# Patient Record
Sex: FEMALE | Race: ASIAN | NOT HISPANIC OR LATINO | Employment: STUDENT | URBAN - METROPOLITAN AREA
[De-identification: names, ages, dates, MRNs, and addresses within clinical notes are randomized per-mention and may not be internally consistent; named-entity substitution may affect disease eponyms.]

---
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
## 2023-03-13 ENCOUNTER — OFFICE VISIT (OUTPATIENT)
Dept: URGENT CARE | Facility: URGENT CARE | Age: 23
End: 2023-03-13
Payer: COMMERCIAL

## 2023-03-13 VITALS
OXYGEN SATURATION: 100 % | DIASTOLIC BLOOD PRESSURE: 73 MMHG | WEIGHT: 125 LBS | HEART RATE: 64 BPM | BODY MASS INDEX: 22.15 KG/M2 | SYSTOLIC BLOOD PRESSURE: 110 MMHG | HEIGHT: 63 IN | TEMPERATURE: 98.8 F

## 2023-03-13 DIAGNOSIS — R30.0 DYSURIA: Primary | ICD-10-CM

## 2023-03-13 LAB
ALBUMIN UR-MCNC: NEGATIVE MG/DL
APPEARANCE UR: CLEAR
BILIRUB UR QL STRIP: NEGATIVE
COLOR UR AUTO: YELLOW
GLUCOSE UR STRIP-MCNC: NEGATIVE MG/DL
HGB UR QL STRIP: NEGATIVE
KETONES UR STRIP-MCNC: NEGATIVE MG/DL
LEUKOCYTE ESTERASE UR QL STRIP: NEGATIVE
NITRATE UR QL: NEGATIVE
PH UR STRIP: 7 [PH] (ref 5–7)
SP GR UR STRIP: 1.01 (ref 1–1.03)
UROBILINOGEN UR STRIP-ACNC: 0.2 E.U./DL

## 2023-03-13 PROCEDURE — 87491 CHLMYD TRACH DNA AMP PROBE: CPT | Performed by: FAMILY MEDICINE

## 2023-03-13 PROCEDURE — 81003 URINALYSIS AUTO W/O SCOPE: CPT

## 2023-03-13 PROCEDURE — 87591 N.GONORRHOEAE DNA AMP PROB: CPT | Performed by: FAMILY MEDICINE

## 2023-03-13 PROCEDURE — 99203 OFFICE O/P NEW LOW 30 MIN: CPT | Performed by: FAMILY MEDICINE

## 2023-03-13 NOTE — PROGRESS NOTES
URGENT CARE VISIT:    ASSESSMENT AND PLAN:      ICD-10-CM    1. Dysuria  R30.0 UA macro with reflex to Microscopic and Culture - Clinc Collect     Neisseria gonorrhoeae PCR - Clinic Collect     Chlamydia trachomatis PCR - Clinic Collect          Differential Diagnosis include but not limited to UTI, Dysuria, Kidney Stone, Urethritis, Vaginitis and STD.  UA is clean today and does not suggest UTI or other diagnosis.  STD is pending and will only contact if positive and will need treatment.  I have asked patient to monitor symptoms at home and return if this should worsen or she should continue to have symptoms.  This of Azo, increase hydration, Tylenol/ibuprofen for discomfort was discussed.    Red flag symptoms needing urgent evaluation was discussed.    Follow up with primary care provider with any problems, questions or concerns or if symptoms worsen or fail to improve. Patient verbalized understanding and is agreeable to plan. The patient was discharged ambulatory and in stable condition.    SUBJECTIVE:   Mirela Castellanos is a 22 year old female who  presents today for a possible UTI. Symptoms of dysuria and frequency started last night but shows improvement this morning.  Patient denies hesitancy, suprapubic pain and pressure, back pain, nausea, vomiting, fever and chills or vaginal symptoms. This patient does not have a history of urinary tract infections.     PMH:   Past Medical History:   Diagnosis Date     Contact dermatitis and other eczema, due to unspecified cause      No pertinent past medical history      Allergies: No known allergies and No known drug allergies   Medications:   Current Outpatient Medications   Medication Sig Dispense Refill     levonorgestrel (KYLEENA) 19.5 MG IUD 1 each (19.5 mg) by Intrauterine route once       MULTI-VITAMIN OR TABS  (Patient not taking: Reported on 3/13/2023)  0     Social History:   Social History     Tobacco Use     Smoking status: Never     Smokeless  "tobacco: Never     Tobacco comments:     Nobody smokes in the house   Substance Use Topics     Alcohol use: No     Alcohol/week: 0.0 standard drinks       ROS:  Review of systems negative except as stated above.    OBJECTIVE:  /73   Pulse 64   Temp 98.8  F (37.1  C) (Temporal)   Ht 1.6 m (5' 3\")   Wt 56.7 kg (125 lb)   LMP 02/13/2023   SpO2 100%   BMI 22.14 kg/m      GENERAL APPEARANCE: healthy, alert and no distress  RESP: lungs clear to auscultation - no rales, rhonchi or wheezes  CV: regular rates and rhythm, normal S1 S2, no murmur noted  ABDOMEN:  soft, nontender, no HSM or masses   PSYCH: mentation appears normal and affect normal/bright  Back: Negative CVA tenderness    Labs:    Results for orders placed or performed in visit on 03/13/23   UA macro with reflex to Microscopic and Culture - Clinc Collect     Status: Normal    Specimen: Urine, Midstream   Result Value Ref Range    Color Urine Yellow Colorless, Straw, Light Yellow, Yellow    Appearance Urine Clear Clear    Glucose Urine Negative Negative mg/dL    Bilirubin Urine Negative Negative    Ketones Urine Negative Negative mg/dL    Specific Gravity Urine 1.010 1.003 - 1.035    Blood Urine Negative Negative    pH Urine 7.0 5.0 - 7.0    Protein Albumin Urine Negative Negative mg/dL    Urobilinogen Urine 0.2 0.2, 1.0 E.U./dL    Nitrite Urine Negative Negative    Leukocyte Esterase Urine Negative Negative    Narrative    Microscopic not indicated     "

## 2023-03-14 LAB
C TRACH DNA SPEC QL NAA+PROBE: NEGATIVE
N GONORRHOEA DNA SPEC QL NAA+PROBE: NEGATIVE

## 2023-04-16 ENCOUNTER — HEALTH MAINTENANCE LETTER (OUTPATIENT)
Age: 23
End: 2023-04-16

## 2023-12-27 ENCOUNTER — OFFICE VISIT (OUTPATIENT)
Dept: OBGYN | Facility: CLINIC | Age: 23
End: 2023-12-27
Payer: COMMERCIAL

## 2023-12-27 VITALS
BODY MASS INDEX: 21.56 KG/M2 | HEART RATE: 83 BPM | HEIGHT: 63 IN | OXYGEN SATURATION: 99 % | WEIGHT: 121.7 LBS | SYSTOLIC BLOOD PRESSURE: 117 MMHG | DIASTOLIC BLOOD PRESSURE: 75 MMHG

## 2023-12-27 DIAGNOSIS — Z12.4 PAP SMEAR FOR CERVICAL CANCER SCREENING: ICD-10-CM

## 2023-12-27 DIAGNOSIS — Z11.3 SCREEN FOR STD (SEXUALLY TRANSMITTED DISEASE): ICD-10-CM

## 2023-12-27 DIAGNOSIS — Z01.419 ENCOUNTER FOR GYNECOLOGICAL EXAMINATION WITHOUT ABNORMAL FINDING: Primary | ICD-10-CM

## 2023-12-27 PROCEDURE — G0145 SCR C/V CYTO,THINLAYER,RESCR: HCPCS

## 2023-12-27 PROCEDURE — 87591 N.GONORRHOEAE DNA AMP PROB: CPT

## 2023-12-27 PROCEDURE — 87491 CHLMYD TRACH DNA AMP PROBE: CPT

## 2023-12-27 PROCEDURE — 99395 PREV VISIT EST AGE 18-39: CPT

## 2023-12-27 NOTE — PROGRESS NOTES
"Mirela is a 23 year old  female who presents for annual exam.     Menses are regular q 28-30 days and normal and crampy lasting  4-7  days.  Menses flow: normal and light.  Patient's last menstrual period was 2023 (exact date).. Using IUD for contraception.  She is not currently considering pregnancy.  Besides routine health maintenance, gc/ct screening today.  GYNECOLOGIC HISTORY:  Menarche: 15  Age at first intercourse: 18 Number of lifetime partners: 2  Mirela is sexually active with 1 male partner(s) and is currently in monogamous relationship with boyfriend.    History sexually transmitted infections:No STD history  STI testing offered?  Accepted  TRAM exposure: No  History of abnormal Pap smear: NO - age 21-29 PAP every 3 years recommended  Family history of breast CA: No  Family history of uterine/ovarian CA: No    Family history of colon CA: No    HEALTH MAINTENANCE:  Cholesterol: (No results found for: \"CHOL\" History of abnormal lipids: No  Mammo: History of abnormal Mammo:  Not applicable.  Regular Self Breast Exams: No  Calcium/Vitamin D intake: source:  dairy Adequate? Yes  TSH:   TSH   Date Value Ref Range Status   2003 1.41 0.4 - 5.0 mU/L Final      Pap; (No results found for: \"PAP\" )    HISTORY:  OB History    Para Term  AB Living   0 0 0 0 0 0   SAB IAB Ectopic Multiple Live Births   0 0 0 0 0     Past Medical History:   Diagnosis Date    Contact dermatitis and other eczema, due to unspecified cause     No pertinent past medical history      Past Surgical History:   Procedure Laterality Date    Nicasio teeth       Family History   Adopted: Yes     Social History     Socioeconomic History    Marital status: Single     Spouse name: None    Number of children: None    Years of education: None    Highest education level: None   Tobacco Use    Smoking status: Never    Smokeless tobacco: Never    Tobacco comments:     Nobody smokes in the house   Substance and Sexual Activity    " "Alcohol use: No     Alcohol/week: 0.0 standard drinks of alcohol    Drug use: No    Sexual activity: Yes     Partners: Male     Birth control/protection: Condom   Social History Narrative    ** Merged History Encounter **         adoptee from [place] at age 10 mos.       Current Outpatient Medications:     levonorgestrel (KYLEENA) 19.5 MG IUD, 1 each (19.5 mg) by Intrauterine route once, Disp:  , Rfl:     MULTI-VITAMIN OR TABS, , Disp: , Rfl: 0     Allergies   Allergen Reactions    No Known Allergies     No Known Drug Allergy        Past medical, surgical, social and family history were reviewed and updated in EPIC.    ROS:   C:     NEGATIVE for fever, chills, change in weight  I:       NEGATIVE for worrisome rashes, moles or lesions  E:     NEGATIVE for vision changes or irritation  E/M: NEGATIVE for ear, mouth and throat problems  R:     NEGATIVE for significant cough or SOB  CV:   NEGATIVE for chest pain, palpitations or peripheral edema  GI:     NEGATIVE for nausea, abdominal pain, heartburn, or change in bowel habits  :   NEGATIVE for frequency, dysuria, hematuria, vaginal discharge, or irregular bleeding  M:     NEGATIVE for significant arthralgias or myalgia  N:      NEGATIVE for weakness, dizziness or paresthesias  E:      NEGATIVE for temperature intolerance, skin/hair changes  P:      NEGATIVE for changes in mood or affect.    EXAM:  /75   Pulse 83   Ht 1.6 m (5' 3\")   Wt 55.2 kg (121 lb 11.2 oz)   LMP 11/20/2023 (Exact Date)   SpO2 99%   BMI 21.56 kg/m     BMI: Body mass index is 21.56 kg/m .  Constitutional: healthy, alert and no distress  Head: Normocephalic. No masses, lesions, tenderness or abnormalities  Neck: Neck supple. Trachea midline. No adenopathy. Thyroid symmetric, normal size.   Cardiovascular: RRR.   Respiratory: Negative.   Breast: Breasts reveal mild symmetric fibrocystic densities, but there are no dominant, discrete, fixed or suspicious masses found.  Gastrointestinal: " Abdomen soft, non-tender, non-distended. No masses, organomegaly.  :  Vulva:  No external lesions, normal female hair distribution, no inguinal adenopathy.    Urethra:  Midline, non-tender, well supported, no discharge  Vagina:  Moist, pink, no abnormal discharge, no lesions  Uterus:  Normal size, anteverted, non-tender, freely mobile  Ovaries:  No masses appreciated, non-tender, mobile  Rectal Exam: deferred  Musculoskeletal: extremities normal  Skin: no suspicious lesions or rashes  Psychiatric: Affect appropriate, cooperative,mentation appears normal.     COUNSELING:   Reviewed preventive health counseling, as reflected in patient instructions       Regular exercise       Healthy diet/nutrition       Contraception       Osteoporosis prevention/bone health       Safe sex practices/STD prevention   reports that she has never smoked. She has never used smokeless tobacco.    Body mass index is 21.56 kg/m .    FRAX Risk Assessment    ASSESSMENT:  23 year old female with satisfactory annual exam  1. 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 Pap smear for cervical cancer screening  Results expected 1 week  - Pap imaged thin layer screen only - recommended age 21 - 24 years    3. Screen for STD (sexually transmitted disease)  - Chlamydia trachomatis/Neisseria gonorrhoeae by PCR  Rtc annually or PRN  EDUAR Haddad CNP

## 2023-12-29 LAB
BKR LAB AP GYN ADEQUACY: NORMAL
BKR LAB AP GYN INTERPRETATION: NORMAL
BKR LAB AP HPV REFLEX: NO
BKR LAB AP PREVIOUS ABNORMAL: NORMAL
C TRACH DNA SPEC QL PROBE+SIG AMP: NEGATIVE
N GONORRHOEA DNA SPEC QL NAA+PROBE: NEGATIVE
PATH REPORT.COMMENTS IMP SPEC: NORMAL
PATH REPORT.COMMENTS IMP SPEC: NORMAL
PATH REPORT.RELEVANT HX SPEC: NORMAL

## 2024-11-27 ENCOUNTER — PATIENT OUTREACH (OUTPATIENT)
Dept: CARE COORDINATION | Facility: CLINIC | Age: 24
End: 2024-11-27

## 2024-12-11 ENCOUNTER — PATIENT OUTREACH (OUTPATIENT)
Dept: CARE COORDINATION | Facility: CLINIC | Age: 24
End: 2024-12-11

## 2025-02-04 ENCOUNTER — OFFICE VISIT (OUTPATIENT)
Dept: PEDIATRICS | Facility: CLINIC | Age: 25
End: 2025-02-04
Payer: COMMERCIAL

## 2025-02-04 VITALS
DIASTOLIC BLOOD PRESSURE: 72 MMHG | BODY MASS INDEX: 22.67 KG/M2 | TEMPERATURE: 98 F | WEIGHT: 123.2 LBS | SYSTOLIC BLOOD PRESSURE: 106 MMHG | RESPIRATION RATE: 18 BRPM | OXYGEN SATURATION: 99 % | HEART RATE: 70 BPM | HEIGHT: 62 IN

## 2025-02-04 DIAGNOSIS — Z11.3 SCREENING FOR STDS (SEXUALLY TRANSMITTED DISEASES): ICD-10-CM

## 2025-02-04 DIAGNOSIS — B37.31 YEAST INFECTION OF THE VAGINA: ICD-10-CM

## 2025-02-04 DIAGNOSIS — L70.0 ACNE VULGARIS: ICD-10-CM

## 2025-02-04 DIAGNOSIS — Z11.4 SCREENING FOR HIV (HUMAN IMMUNODEFICIENCY VIRUS): ICD-10-CM

## 2025-02-04 DIAGNOSIS — B96.89 BACTERIAL VAGINITIS: ICD-10-CM

## 2025-02-04 DIAGNOSIS — N76.0 BACTERIAL VAGINITIS: ICD-10-CM

## 2025-02-04 DIAGNOSIS — Z00.00 ROUTINE GENERAL MEDICAL EXAMINATION AT A HEALTH CARE FACILITY: Primary | ICD-10-CM

## 2025-02-04 LAB
CLUE CELLS: PRESENT
EST. AVERAGE GLUCOSE BLD GHB EST-MCNC: 97 MG/DL
HBA1C MFR BLD: 5 % (ref 0–5.6)
TRICHOMONAS, WET PREP: ABNORMAL
WBC'S/HIGH POWER FIELD, WET PREP: ABNORMAL
YEAST, WET PREP: PRESENT

## 2025-02-04 PROCEDURE — 87591 N.GONORRHOEAE DNA AMP PROB: CPT | Performed by: INTERNAL MEDICINE

## 2025-02-04 PROCEDURE — 80061 LIPID PANEL: CPT | Performed by: INTERNAL MEDICINE

## 2025-02-04 PROCEDURE — 86780 TREPONEMA PALLIDUM: CPT | Performed by: INTERNAL MEDICINE

## 2025-02-04 PROCEDURE — 87389 HIV-1 AG W/HIV-1&-2 AB AG IA: CPT | Performed by: INTERNAL MEDICINE

## 2025-02-04 PROCEDURE — 99395 PREV VISIT EST AGE 18-39: CPT | Performed by: INTERNAL MEDICINE

## 2025-02-04 PROCEDURE — 87210 SMEAR WET MOUNT SALINE/INK: CPT | Performed by: INTERNAL MEDICINE

## 2025-02-04 PROCEDURE — 83036 HEMOGLOBIN GLYCOSYLATED A1C: CPT | Performed by: INTERNAL MEDICINE

## 2025-02-04 PROCEDURE — 36415 COLL VENOUS BLD VENIPUNCTURE: CPT | Performed by: INTERNAL MEDICINE

## 2025-02-04 PROCEDURE — 99213 OFFICE O/P EST LOW 20 MIN: CPT | Mod: 25 | Performed by: INTERNAL MEDICINE

## 2025-02-04 PROCEDURE — 87491 CHLMYD TRACH DNA AMP PROBE: CPT | Performed by: INTERNAL MEDICINE

## 2025-02-04 RX ORDER — METRONIDAZOLE 500 MG/1
500 TABLET ORAL 2 TIMES DAILY
Qty: 14 TABLET | Refills: 0 | Status: SHIPPED | OUTPATIENT
Start: 2025-02-04 | End: 2025-02-11

## 2025-02-04 RX ORDER — FLUCONAZOLE 150 MG/1
150 TABLET ORAL ONCE
Qty: 2 TABLET | Refills: 0 | Status: SHIPPED | OUTPATIENT
Start: 2025-02-04 | End: 2025-02-04

## 2025-02-04 RX ORDER — ERYTHROMYCIN 20 MG/G
GEL TOPICAL DAILY
Qty: 60 G | Refills: 11 | Status: SHIPPED | OUTPATIENT
Start: 2025-02-04

## 2025-02-04 SDOH — HEALTH STABILITY: PHYSICAL HEALTH: ON AVERAGE, HOW MANY DAYS PER WEEK DO YOU ENGAGE IN MODERATE TO STRENUOUS EXERCISE (LIKE A BRISK WALK)?: 3 DAYS

## 2025-02-04 SDOH — HEALTH STABILITY: PHYSICAL HEALTH: ON AVERAGE, HOW MANY MINUTES DO YOU ENGAGE IN EXERCISE AT THIS LEVEL?: 20 MIN

## 2025-02-04 ASSESSMENT — SOCIAL DETERMINANTS OF HEALTH (SDOH): HOW OFTEN DO YOU GET TOGETHER WITH FRIENDS OR RELATIVES?: ONCE A WEEK

## 2025-02-04 ASSESSMENT — PAIN SCALES - GENERAL: PAINLEVEL_OUTOF10: NO PAIN (0)

## 2025-02-04 NOTE — PATIENT INSTRUCTIONS
Patient Education     Let me know if the topical erythromycin doesn't help for the acne/perioral dermatitis in 6 weeks and we can try something else.      Preventive Care Advice   This is general advice given by our system to help you stay healthy. However, your care team may have specific advice just for you. Please talk to your care team about your preventive care needs.  Nutrition  Eat 5 or more servings of fruits and vegetables each day.  Try wheat bread, brown rice and whole grain pasta (instead of white bread, rice, and pasta).  Get enough calcium and vitamin D. Check the label on foods and aim for 100% of the RDA (recommended daily allowance).  Lifestyle  Exercise at least 150 minutes each week  (30 minutes a day, 5 days a week).  Do muscle strengthening activities 2 days a week. These help control your weight and prevent disease.  No smoking.  Wear sunscreen to prevent skin cancer.  Have a dental exam and cleaning every 6 months.  Yearly exams  See your health care team every year to talk about:  Any changes in your health.  Any medicines your care team has prescribed.  Preventive care, family planning, and ways to prevent chronic diseases.  Shots (vaccines)   HPV shots (up to age 26), if you've never had them before.  Hepatitis B shots (up to age 59), if you've never had them before.  COVID-19 shot: Get this shot when it's due.  Flu shot: Get a flu shot every year.  Tetanus shot: Get a tetanus shot every 10 years.  Pneumococcal, hepatitis A, and RSV shots: Ask your care team if you need these based on your risk.  Shingles shot (for age 50 and up)  General health tests  Diabetes screening:  Starting at age 35, Get screened for diabetes at least every 3 years.  If you are younger than age 35, ask your care team if you should be screened for diabetes.  Cholesterol test: At age 39, start having a cholesterol test every 5 years, or more often if advised.  Bone density scan (DEXA): At age 50, ask your care team  if you should have this scan for osteoporosis (brittle bones).  Hepatitis C: Get tested at least once in your life.  STIs (sexually transmitted infections)  Before age 24: Ask your care team if you should be screened for STIs.  After age 24: Get screened for STIs if you're at risk. You are at risk for STIs (including HIV) if:  You are sexually active with more than one person.  You don't use condoms every time.  You or a partner was diagnosed with a sexually transmitted infection.  If you are at risk for HIV, ask about PrEP medicine to prevent HIV.  Get tested for HIV at least once in your life, whether you are at risk for HIV or not.  Cancer screening tests  Cervical cancer screening: If you have a cervix, begin getting regular cervical cancer screening tests starting at age 21.  Breast cancer scan (mammogram): If you've ever had breasts, begin having regular mammograms starting at age 40. This is a scan to check for breast cancer.  Colon cancer screening: It is important to start screening for colon cancer at age 45.  Have a colonoscopy test every 10 years (or more often if you're at risk) Or, ask your provider about stool tests like a FIT test every year or Cologuard test every 3 years.  To learn more about your testing options, visit:   .  For help making a decision, visit:   https://bit.ly/al83199.  Prostate cancer screening test: If you have a prostate, ask your care team if a prostate cancer screening test (PSA) at age 55 is right for you.  Lung cancer screening: If you are a current or former smoker ages 50 to 80, ask your care team if ongoing lung cancer screenings are right for you.  For informational purposes only. Not to replace the advice of your health care provider. Copyright   2023 [employer]. All rights reserved. Clinically reviewed by the [facility] Transitions Program. [facility] 480457 - REV 01/24.  Safer Sex: Care Instructions  Overview  Safer sex is a way to reduce your  risk of getting a sexually transmitted infection (STI). It can also help prevent pregnancy.  Several products can help you practice safer sex and reduce your chance of STIs. One of the best is a condom. There are internal and external condoms. You can use a special rubber sheet (dental dam) for protection during oral sex. Disposable gloves can keep your hands from touching blood, semen, or other body fluids that can carry infections.  Remember that birth control methods such as diaphragms, IUDs, foams, and birth control pills do not stop you from getting STIs.  Follow-up care is a key part of your treatment and safety. Be sure to make and go to all appointments, and call your doctor if you are having problems. It's also a good idea to know your test results and keep a list of the medicines you take.  How can you care for yourself at home?  Think about getting vaccinated to help prevent hepatitis A, hepatitis B, and human papillomavirus (HPV). They can be spread through sex.  Use a condom every time you have sex. Use an external condom, which goes on the penis. Or use an internal condom, which goes into the vagina or anus.  Make sure you use the right size external condom. A condom that's too small can break easily. A condom that's too big can slip off during sex.  Use a new condom each time you have sex. Be careful not to poke a hole in the condom when you open the wrapper.  Don't use an internal condom and an external condom at the same time.  Never use petroleum jelly (such as Vaseline), grease, hand lotion, baby oil, or anything with oil in it. These products can make holes in the condom.  After intercourse, hold the edge of the condom as you remove it. This will help keep semen from spilling out of the condom.  Do not have sex with anyone who has symptoms of an STI, such as sores on the genitals or mouth.  Do not drink a lot of alcohol or use drugs before sex.  Limit your sex partners. Sex with one partner who  "has sex only with you can reduce your risk of getting an STI.  Don't share sex toys. But if you do share them, use a condom and clean the sex toys between each use.  Talk to any partners before you have sex. Talk about what you feel comfortable with and whether you have any boundaries with sex. And find out if your partner or partners may be at risk for any STI. Keep in mind that a person may be able to spread an STI even if they do not have symptoms. You and any partners may want to get tested for STIs.  Where can you learn more?  Go to https://www.[employer].net/patiented  Enter B608 in the search box to learn more about \"Safer Sex: Care Instructions.\"  Current as of: April 30, 2024  Content Version: 14.3    2024 [employer].   Care instructions adapted under license by your healthcare professional. If you have questions about a medical condition or this instruction, always ask your healthcare professional. [employer] disclaims any warranty or liability for your use of this information.       "

## 2025-02-04 NOTE — PROGRESS NOTES
Preventive Care Visit  [facility] ACOSTA Estrada MD, Internal Medicine  Feb 4, 2025      Assessment & Plan     Routine general medical examination at a health care facility  Routine health education discussed: calcium, diet, exercise, weight, safety. Follow-up with OB/gyn next year for IUD replacement.    - Chlamydia trachomatis/Neisseria gonorrhoeae by PCR- VAGINAL SELF-SWAB; Future  - Lipid panel reflex to direct LDL Fasting; Future  - Hemoglobin A1c; Future  - Treponema Abs w Reflex to RPR and Titer; Future  - Wet prep - Clinic Collect  - Chlamydia trachomatis/Neisseria gonorrhoeae by PCR- VAGINAL SELF-SWAB  - Lipid panel reflex to direct LDL Fasting  - Hemoglobin A1c  - Treponema Abs w Reflex to RPR and Titer    Screening for STDs (sexually transmitted diseases)    - Chlamydia trachomatis/Neisseria gonorrhoeae by PCR- VAGINAL SELF-SWAB; Future  - Treponema Abs w Reflex to RPR and Titer; Future  - Wet prep - Clinic Collect  - Chlamydia trachomatis/Neisseria gonorrhoeae by PCR- VAGINAL SELF-SWAB  - Treponema Abs w Reflex to RPR and Titer    Screening for HIV (human immunodeficiency virus)    - HIV Antigen Antibody Combo; Future  - HIV Antigen Antibody Combo    Acne vulgaris  Possible perioral dermatitis given location.  Treat with topical antibiotic per orders and notify me if not better 2 months.  - erythromycin with ethanol (EMGEL) 2 % gel; Apply topically daily.      Yeast infection of the vagina  Treat per orders  - fluconazole (DIFLUCAN) 150 MG tablet; Take 1 tablet (150 mg) by mouth once for 1 dose. And may repeat in 3 days prn    Bacterial vaginitis  Treat per orders  - metroNIDAZOLE (FLAGYL) 500 MG tablet; Take 1 tablet (500 mg) by mouth 2 times daily for 7 days.            Counseling  Appropriate preventive services were addressed with this patient via screening, questionnaire, or discussion as appropriate for fall prevention, nutrition, physical activity, Tobacco-use cessation,  social engagement, weight loss and cognition.  Checklist reviewing preventive services available has been given to the patient.  Reviewed patient's diet, addressing concerns and/or questions.   She is at risk for lack of exercise and has been provided with information to increase physical activity for the benefit of her well-being.           Christi Dietz is a 24 year old, presenting for the following:  Physical        2/4/2025     3:50 PM   Additional Questions   Roomed by Willy FRANKLIN   Accompanied by N/A         2/4/2025     3:50 PM   Patient Reported Additional Medications   Patient reports taking the following new medications No          HPI  Graduated last May from [employer] and working at [email or web address]      Health Care Directive  Patient does not have a Health Care Directive: Discussed advance care planning with patient; however, patient declined at this time.      2/4/2025   General Health   How would you rate your overall physical health? Good   Feel stress (tense, anxious, or unable to sleep) Not at all         2/4/2025   Nutrition   Three or more servings of calcium each day? (!) NO   Diet: Regular (no restrictions)   How many servings of fruit and vegetables per day? (!) 0-1   How many sweetened beverages each day? 0-1         2/4/2025   Exercise   Days per week of moderate/strenous exercise 3 days - walking and stairmaster   Average minutes spent exercising at this level 20 min         2/4/2025   Social Factors   Frequency of gathering with friends or relatives Once a week   Worry food won't last until get money to buy more No   Food not last or not have enough money for food? No   Do you have housing? (Housing is defined as stable permanent housing and does not include staying ouside in a car, in a tent, in an abandoned building, in an overnight shelter, or couch-surfing.) Yes   Are you worried about losing your housing? No   Lack of transportation? No   Unable to get utilities (heat,electricity)?  "No         2/4/2025   Dental   Dentist two times every year? Yes            Today's PHQ-2 Score:       2/4/2025     1:26 PM   PHQ-2 ( 1999 [employer])   Q1: Little interest or pleasure in doing things 0   Q2: Feeling down, depressed or hopeless 0   PHQ-2 Score 0    Q1: Little interest or pleasure in doing things Not at all   Q2: Feeling down, depressed or hopeless Not at all   PHQ-2 Score 0       Patient-reported           2/4/2025   Substance Use   Alcohol more than 3/day or more than 7/wk No   Do you use any other substances recreationally? No     Social History     Tobacco Use    Smoking status: Never    Smokeless tobacco: Never    Tobacco comments:     Nobody smokes in the house   Vaping Use    Vaping status: Never Used   Substance Use Topics    Alcohol use: No     Alcohol/week: 0.0 standard drinks of alcohol    Drug use: No           2/4/2025   One time HIV Screening   Previous HIV test? No         2/4/2025   STI Screening   New sexual partner(s) since last STI/HIV test? (!) YES      History of abnormal Pap smear: No - age 21-29 PAP every 3 years recommended        12/27/2023     1:24 PM   PAP / HPV   PAP Negative for Intraepithelial Lesion or Malignancy (NILM)            2/4/2025   Contraception/Family Planning   Questions about contraception or family planning (!) YES - has IUD and due to be replaced         Reviewed and updated as needed this visit by Provider     Meds  Problems               Labs reviewed in EPIC         Objective    Exam  /72 (BP Location: Right arm, Patient Position: Sitting, Cuff Size: Adult Regular)   Pulse 70   Temp 98  F (36.7  C) (Temporal)   Resp 18   Ht 1.583 m (5' 2.32\")   Wt 55.9 kg (123 lb 3.2 oz)   SpO2 99%   BMI 22.30 kg/m     Estimated body mass index is 22.3 kg/m  as calculated from the following:    Height as of this encounter: 1.583 m (5' 2.32\").    Weight as of this encounter: 55.9 kg (123 lb 3.2 oz).    Physical Exam  GENERAL: alert and no distress  EYES: Eyes " grossly normal to inspection, PERRL and conjunctivae and sclerae normal  HENT: ear canals and TM's normal, nose and mouth without ulcers or lesions  NECK: no adenopathy, no asymmetry, masses, or scars  RESP: lungs clear to auscultation - no rales, rhonchi or wheezes  CV: regular rate and rhythm, normal S1 S2, no S3 or S4, no murmur, click or rub, no peripheral edema  ABDOMEN: soft, nontender, no hepatosplenomegaly, no masses and bowel sounds normal  MS: no gross musculoskeletal defects noted, no edema  SKIN: no suspicious lesions or rashes  NEURO: Normal strength and tone, mentation intact and speech normal  PSYCH: mentation appears normal, affect normal/bright        Signed Electronically by: Chana Estrada MD

## 2025-02-05 LAB
C TRACH DNA SPEC QL PROBE+SIG AMP: NEGATIVE
CHOLEST SERPL-MCNC: 154 MG/DL
FASTING STATUS PATIENT QL REPORTED: YES
HDLC SERPL-MCNC: 73 MG/DL
HIV 1+2 AB+HIV1 P24 AG SERPL QL IA: NONREACTIVE
LDLC SERPL CALC-MCNC: 70 MG/DL
N GONORRHOEA DNA SPEC QL NAA+PROBE: NEGATIVE
NONHDLC SERPL-MCNC: 81 MG/DL
SPECIMEN TYPE: NORMAL
T PALLIDUM AB SER QL: NONREACTIVE
TRIGL SERPL-MCNC: 57 MG/DL

## 2025-07-16 ENCOUNTER — MYC MEDICAL ADVICE (OUTPATIENT)
Dept: PEDIATRICS | Facility: CLINIC | Age: 25
End: 2025-07-16
Payer: COMMERCIAL

## 2025-07-16 ENCOUNTER — E-VISIT (OUTPATIENT)
Dept: PEDIATRICS | Facility: CLINIC | Age: 25
End: 2025-07-16
Payer: COMMERCIAL

## 2025-07-16 DIAGNOSIS — N89.8 VAGINAL DISCHARGE: ICD-10-CM

## 2025-07-16 DIAGNOSIS — R30.0 DYSURIA: Primary | ICD-10-CM

## 2025-07-16 PROCEDURE — 99207 PR NON-BILLABLE SERV PER CHARTING: CPT | Performed by: INTERNAL MEDICINE

## 2025-07-16 NOTE — TELEPHONE ENCOUNTER
Please see patient MyChart message  -states last few days has vaginal discharge  -states urine was clouded and odorous but now improved  -states vaginal area itching and pain with urination  -states at [facility]OV has rx for yeast infection and took 2 days ago, no improvement    Called and spoke with patient to clarify.    Denies fever and flank pain. States has no other symptoms than noted in message.     Discussed e-visit as has typical yeast/UTI symptoms. Patient agreeable. Sent [employer] per patient request with link.    Instructed patient to call [phone] for new or worsening symptoms or further questions. Patient verbalized understanding and agrees with the plan.     Almita Dietrich RN

## 2025-07-17 ENCOUNTER — LAB (OUTPATIENT)
Dept: LAB | Facility: CLINIC | Age: 25
End: 2025-07-17
Payer: COMMERCIAL

## 2025-07-17 DIAGNOSIS — N89.8 VAGINAL DISCHARGE: ICD-10-CM

## 2025-07-17 DIAGNOSIS — R30.0 DYSURIA: ICD-10-CM

## 2025-07-17 LAB
ALBUMIN UR-MCNC: 100 MG/DL
APPEARANCE UR: CLEAR
BACTERIA #/AREA URNS HPF: ABNORMAL /HPF
BILIRUB UR QL STRIP: NEGATIVE
CLUE CELLS: ABNORMAL
COLOR UR AUTO: YELLOW
GLUCOSE UR STRIP-MCNC: NEGATIVE MG/DL
HGB UR QL STRIP: NEGATIVE
KETONES UR STRIP-MCNC: NEGATIVE MG/DL
LEUKOCYTE ESTERASE UR QL STRIP: ABNORMAL
NITRATE UR QL: NEGATIVE
PH UR STRIP: 6.5 [PH] (ref 5–7)
RBC #/AREA URNS AUTO: ABNORMAL /HPF
SP GR UR STRIP: 1.02 (ref 1–1.03)
SQUAMOUS #/AREA URNS AUTO: ABNORMAL /LPF
TRICHOMONAS, WET PREP: ABNORMAL
UROBILINOGEN UR STRIP-ACNC: 0.2 E.U./DL
WBC #/AREA URNS AUTO: ABNORMAL /HPF
WBC'S/HIGH POWER FIELD, WET PREP: ABNORMAL
YEAST, WET PREP: ABNORMAL

## 2025-07-19 LAB — BACTERIA UR CULT: NORMAL
